# Patient Record
(demographics unavailable — no encounter records)

---

## 2024-10-21 NOTE — ASSESSMENT
[FreeTextEntry1] : Forma completed for Physical therapy and balance Labs check Valproic acid level and also Serum Sodium No change in meds for now; RTC 2 months

## 2024-10-21 NOTE — HISTORY OF PRESENT ILLNESS
[FreeTextEntry1] : Getting physical therapy for balance issues after  shunt  [de-identified] : Medication without change  Using CPAP  No Flu vaccine

## 2024-11-27 NOTE — HISTORY OF PRESENT ILLNESS
[FreeTextEntry8] : Patient with worsening urinary incontinence while sleeping However, does drink large amount of fluids even before he goes to bed  Encouraged him to stop drinking am hour before he goes to bed

## 2024-12-30 NOTE — HEALTH RISK ASSESSMENT
[No] : No [No falls in past year] : Patient reported no falls in the past year [0] : 2) Feeling down, depressed, or hopeless: Not at all (0) [[id number]] : 0

## 2024-12-30 NOTE — HISTORY OF PRESENT ILLNESS
[FreeTextEntry1] : Has some insurance issues regarding meds and also physical therapy  [de-identified] : Not working On disability

## 2025-01-30 NOTE — HEALTH RISK ASSESSMENT
[No] : No [No falls in past year] : Patient reported no falls in the past year [0] : 2) Feeling down, depressed, or hopeless: Not at all (0) [HSV1Oizgs] : 0

## 2025-03-31 NOTE — HEALTH RISK ASSESSMENT
[No] : No [No falls in past year] : Patient reported no falls in the past year [0] : 2) Feeling down, depressed, or hopeless: Not at all (0) [Never] : Never [[id number]] : 0

## 2025-03-31 NOTE — HISTORY OF PRESENT ILLNESS
[FreeTextEntry1] : Needs form completed for disability   [de-identified] : Incontinence  Gets upset constantly  Being followed by Dr Easton, also Dr. Damico No chang in curren tmedication

## 2025-05-07 NOTE — HISTORY OF PRESENT ILLNESS
[FreeTextEntry1] : 47M with hx of multiple brain surgery/ shunt,, hx of seizures,  presents as new visit for nocturnal enuresis  Referred by: Dr. Sullivan  Presents today with mother. Nocturnal enuresis almost every night. Soaks through his pampers.  Urinary nocturnal enuresis worsened over the last year.  Reports having more control during the day but still has urinary urgency, sometimes has urge incontinence, has to relieve himself wherever he can. Wears pampers during day as well. Incontinence started after his brain surgery.  Does not feel empty after urination.  No dysuria or hematuria.  has been on oxybutynin for the last 2-3 years. Started mirabegron over the last year. No improvement.  Has sleep apnea, uses CPAP Machine nightly.  Has girlfriend. No ED or EjD.    NeuroSx:  Initially was following with Dr. Peck in the past for routine neuro checks. Last seizure 1/2022. VPS last adjusted 8/2022. Currently following with Dr. Easton, has pending appt next week.  Also following with Dr. D'Amico for  shunt - CT head stable 1/2024.    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

## 2025-05-07 NOTE — ASSESSMENT
[FreeTextEntry1] : Hx of multiple brain surgeries/brain shunt/seizures neurogenic bladder  secondary nocturnal enuresis  sleep apnea, on CPAP  No further improvement on oxybutynin + mirabegron   UA/UCx PVR to r/o overflow incontinence/retention --> 95 cc pending appointment with neurosurgery next week  stop myrbetriq  start oxybutynin 15 mg ER nightly referral to Dr. De La Paz for urodynamics

## 2025-05-07 NOTE — PHYSICAL EXAM
[General Appearance - Well Developed] : well developed [General Appearance - Well Nourished] : well nourished [] : no respiratory distress [Abdomen Tenderness] : non-tender [Costovertebral Angle Tenderness] : no ~M costovertebral angle tenderness [Penis Abnormality] : normal uncircumcised penis [Scrotum] : the scrotum was normal [Testes Tenderness] : no tenderness of the testes [de-identified] : 14 cc testicles b/l, palpable vas b/l

## 2025-05-12 NOTE — ASSESSMENT
[FreeTextEntry1] : 47 year old presenting for follow up with known NAVARRO on PAP therapy.   Data reviewed: Compliance report 04/09/25-05/08/25- 18/30 days used; 5 hours and 7 minutes time used; AHI 3.8    NAVARRO Insomnia Snoring   Patient with previous sleep study reported at [facility] and compliance report showing low use. Advised that he does need to try and use every night with minimum of 70% and 4 hours. He is using enough hours just needs to increase days used. Seems to be having symptoms return because of minimal use.  - Advised need to increase days used - At goal hours - Previous clinical benefit

## 2025-05-12 NOTE — HISTORY OF PRESENT ILLNESS
[Never] : never [TextBox_4] : 47 year old with long standing hx of NAVARRO presenting for follow up on PAP therapy. States that he does use the machine most nights. Still feels and improvement from the PAP therapy. Able to get supplies without issues but does state that he is not using the machine every night.  [ESS] : 5

## 2025-06-02 NOTE — HEALTH RISK ASSESSMENT
[No] : No [No falls in past year] : Patient reported no falls in the past year [0] : 2) Feeling down, depressed, or hopeless: Not at all (0) [Never] : Never [[id number]] : 0

## 2025-06-02 NOTE — PLAN
[FreeTextEntry1] : Multiple issues discussed ; Valproic acid added once a day; Will discuss with Dr Wright  To repeat labs; Urology plans noted

## 2025-06-02 NOTE — HISTORY OF PRESENT ILLNESS
[FreeTextEntry1] : All notes reviewed; Dr Easton had stopped Valproic acid, but mom states he was worse and more incontinence  Mom had started once a night with improvement  [de-identified] : All other meds reviewed with patients' mother

## 2025-06-11 NOTE — ASSESSMENT
[FreeTextEntry1] :   Impression/plan: 47-year-old gentleman with multiple brain surgeries/ shunt with OAB-wet.  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  Failed 1st and 2nd line therapies. 1. Options for management of the patient's overactive bladder and incontinence discussed at length. These included medical therapy, behavioral modification, bladder retraining, and surgical options. Surgical options reviewed included injection of botulinum toxin versus sacral nerve stimulation therapy.  Literature provided for the patient to review.  He would like to discuss with his family, I offered discussed with them as well after the visit today.  He will think about the options and call back with a final decision.

## 2025-06-11 NOTE — HISTORY OF PRESENT ILLNESS
[FreeTextEntry1] : 47-year-old gentleman with multiple brain surgeries/ shunt with a history of seizures referred for daytime frequency and urge incontinence in addition to nocturnal enuresis.  He has tried oral med therapies which have been unsuccessful in the form of both anticholinergics and beta 3 agonist.  He goes through several Pampers in the evening hours which soaked through.  He is very unhappy with his voiding pattern.  He did have urodynamics performed, here today to review the study and discuss treatment options.  Chart reviewed extensively.  UDS -   Filling/Storage Phase: First sensation 57 mL, First desire 57 mL and Normal desire 142 mL. Involuntary contractions were present . Pt did not demonstrate stress urinary incontinence. Pt demomnstrated urge urinary incontinence. DLPP 98 cm/H20. Compliance: normal. EMG Activity: normal.    Voiding Phase: Qmax 9.1 mL/s , Pdet at Qmax 44 cm/H20, Qmax at Pdet 7.9 mL/s, Pavg 50.9 cm/H20 and Qavg 4.3 mL/s. EMG activity was synergic.   Additional Comments: Corrected BOOI - 26.   Urodynamic Interpretation :   Normal bladder sensation. Decreased bladder capacity. Patient experiencing detrusor instability. Pt has an appropriate EMG response to involuntary contractions:. The patient has severe  urge incontinence. The uroflow rate is decreased. The uroflow pattern is staccato. The detrusor contractility is normal. The intravesical voiding pressure is normal. The patient has complete bladder emptying. The spincter activity  is normal synergic.   Additional Procedure Related Findings/Comments: pt experienced leakage twice, there is no true capacity. Pt voided everything else in the restroom. PVR 0.

## 2025-06-11 NOTE — PHYSICAL EXAM
[General Appearance - Well Developed] : well developed [General Appearance - Well Nourished] : well nourished [Normal Appearance] : normal appearance [Well Groomed] : well groomed [General Appearance - In No Acute Distress] : no acute distress [Respiration, Rhythm And Depth] : normal respiratory rhythm and effort [] : no respiratory distress [Normal Station and Gait] : the gait and station were normal for the patient's age [Exaggerated Use Of Accessory Muscles For Inspiration] : no accessory muscle use [Oriented To Time, Place, And Person] : oriented to person, place, and time

## 2025-06-11 NOTE — LETTER BODY
[Dear  ___] : Dear  [unfilled], [Consult Letter:] : I had the pleasure of evaluating your patient, [unfilled]. [Please see my note below.] : Please see my note below. [Consult Closing:] : Thank you very much for allowing me to participate in the care of this patient.  If you have any questions, please do not hesitate to contact me. [Sincerely,] : Sincerely, [FreeTextEntry3] : Melecio De La Paz MD System Director Urogynecology/URPS Department of Urology [facility]   at [facility]  of Urology [facility] [employer] [facility] at [facility]

## 2025-07-02 NOTE — HISTORY OF PRESENT ILLNESS
Pls call pt and ask which pharmacy lorazepam should go to.   [FreeTextEntry1] : 47 year old male with PMHx significant for congenital aqueductal stenosis and hydrocephalus 2011 s/p third ventriculostomy 2011, fall 2013 c/p SDH s/p left-sided craniotomy for evac and VPS placement in October 2013 by Dr. Ramos ([facility]), seizures, etoh, who p/w worsening condition, gait instability, falls.  3/16/23 pt presented for evaluation of progressively worsening gait instability with multiple falls, worsening nighttime incontinence x 1 year. Continues with memory issues and cognitive changes. Gait and balance is worse when turning. Also with recent seizure, states compliant with Lamictal but that he has not recently seen neurologist. Endorses drinking when seizure last month. Xray shunt series shows no discontinuity. CTH with stable ventricular size from his most recent scan. Shunt adjusted from 1.0 to 0.5 and plan made to RTC 2 weeks with repeat CTH. Referral provided to Dr. Kelly neurologist to establish care with neurologist. Of note, left-sided Margarita's and left-sided hyperreflexia. MRI cervical spine to rule out cervical pathology ordered.  3/30/22 F/u: Denied any changes since shunt adjustment 2 weeks ago, continues to have gait instability, memory changes, nighttime incontinence. Denied any more falls. Shunt at 0.5. Plan to obtain MRI cervical and repeat CTH 4 weeks with follow- up after.  4/27/22 pt returned. CTH 4/19/22 stable. MRI cervical spine 4/19/22 without compressive pathology. Shunt maintained at 0.5. Recommended pt rtc 1 year with new CTH.  8/31/22 pt returned. He reported unwitnessed fall at home ~2 weeks ago while he was waking up and fell out of bed with unknown cause and possible head trauma. As per patient, he woke up with rib pain with headache. He was seen internal medicine Dr. Guy Seo who ordered CTH. He c/o worsening personality changes (more irritable), persistent urinary incontinence, worsening balance. His seizure medications (Lamictal) has been managed by Dr. Sullivan, has not following neurology since 2018. known h/o absent seizure. CTH reviewed, persistent ventriculomegaly compared to the previous CTH. Recommended he see neurologist Dr. Easton and Dr. Esparza for cognitive NP testing.  8/31/23 pt returned for f/u: He completed cognitive NP testing with Dr. Esparza 8/15/22. Endorsed continued balance issues, short term memory issues, urinary incontinence. Discussed benefits of lumbar puncture; however, pt wished to continue with conservative management. Medtronic shunt maintained @ 0.5. Plan made to notify if would like to pursue LP.  1/10/24 pt returned for f/u and review of recent CTH done 12/27/23 which was stable. Endorsed gait with slight improvement since last visit with physical therapy. Urinary incontinence unchanged. Denies worsening of symptoms. Recommended continued f/u with PCP for comprehensive care, RTC prn.   Returns TODAY for evaluation due to worsening walking and nighttime incontinence.  Repeated CTH 6/25/25.  Patient endorses walking recently worsening over the past few months. He feels his left foot drags when walking, but denies weakness of left foot. Denies neck pain/back pain.  He continues with physical therapy.  Also with nighttime urinary incontinence worsening over the past few months. He has continued follow- up with urology s/p urodynamic interpretation which showed  phasic detrusor overactivity with urge incontinence. Surgical options discussed as he failed first and second line therapies; however he wished to think about it.    PCP: Dr. Anamaria Sullivan  Neurology: Vicky Easton Urology: Melecio De La Paz

## 2025-07-02 NOTE — DATA REVIEWED
[de-identified] : ACC: [id number]     EXAM:  CT BRAIN   ORDERED BY: KATHERINESMITH FRAZIERJEREMYPING  PROCEDURE DATE:  06/25/2025    INTERPRETATION:  CT head without IV contrast  CLINICAL INFORMATION:     reassess ventricular size;  ACT  Ordering Dxs: G91.9 Hydrocephalus, unspecified  TECHNIQUE:  Contiguous axial 3 mm thick images were acquired.  This data set was reconstructed in the sagittal and coronal planes.  Contrast was not administered for this examination. CONTRAST:    None DOSE INFORMATION:   This scan was performed using automatic exposure control (radiation dose reduction software) to obtain a diagnostic image quality scan with patient dose as low as reasonably achievable.   Total DLP for this examination is estimated at 839 mGy*cm.  COMPARISON:   CT head 12/27/2023  FINDINGS:  BRAIN PARENCHYMA:    The brain  remains significant for focal encephalomalacia involving the left temporal tip. There is no overlying right-sided craniotomy. There is a lesser degree of involvement at the left frontal operculum and anterior insular cortex. These findings appear unchanged from 2023. The right cerebral hemisphere remains intact.  No acute cerebral cortical infarct is seen.  No intracranial hemorrhage is found.  No mass effect is found in the brain.  CSF SPACES: Right-sided ventricular catheter crosses the anterior parasagittal right frontal lobe frontal horn of the right lateral ventricle and septum pellucidum to terminate in the anterior body of the left lateral ventricle. The lateral ventricles remain markedly dilated. The ependymal margin approaches the gray-white matter boundary most prominently at the posterior frontal vertex. There is differential dilatation of temporal horn left lateral ventricle on an ex vacuo basis. The third ventricle is dilated. The cerebral aqueduct may be attenuated at its distal aspect. The fourth ventricle is not dilated. By the CT technique there is no evidence for transependymal resorption of CSF.  HEAD AND NECK STRUCTURES:  The orbits are unremarkable.  The included paranasal sinuses  are clear.  The nasal cavity appears intact.  The nasopharynx is symmetric.  The central skull base is intact.  The temporal bones demonstrate patent petrous air cells.  The calvarium demonstrates multiple craniotomies.   IMPRESSION:  1. Ventricular configuration is unchanged since December 2023. Right frontal ventricular catheter appears unchanged  2. Left temporal frontal and insular focal encephalomalacia with overlying craniotomy, also unchanged  --- End of Report ---

## 2025-07-02 NOTE — PHYSICAL EXAM
[General Appearance - Alert] : alert [General Appearance - In No Acute Distress] : in no acute distress [General Appearance - Well-Appearing] : healthy appearing [Person] : oriented to person [Place] : oriented to place [Time] : oriented to time [Motor Tone] : muscle tone was normal in all four extremities [Motor Strength] : muscle strength was normal in all four extremities [Sensation Tactile Decrease] : light touch was intact [Sclera] : the sclera and conjunctiva were normal [Neck Appearance] : the appearance of the neck was normal [] : no respiratory distress [Respiration, Rhythm And Depth] : normal respiratory rhythm and effort [Skin Color & Pigmentation] : normal skin color and pigmentation [FreeTextEntry5] : CN II-XII grossly intact

## 2025-07-02 NOTE — ASSESSMENT
[FreeTextEntry1] : This 47-year-old patient presented with worsening gait instability, multiple falls, and nighttime urinary incontinence. They have a history of congenital aqueductal stenosis, hydrocephalus, and a Medtronic Strata  shunt placed in 2013. A previous shunt adjustment (from 1 to 0.5) did not help.  They also have a history of seizures.  A recent CT scan showed no changes, and the shunt appears to be functioning properly. Shunt tap was performed. Opening pressure was <3mm H20. WIll evaluate for progress before discussing possible shunt revision.    Dr. D'Amico independently reviewed all available images with patient and his mother.  VPS pumps and refills. Setting confirmed at 0.5  S/P shunt tap in the office. Sterile precautions maintained.  Opening pressure: <2 30 cc CSF removed  PLAN: - Follow- up tomorrow via telephone for check in and re-eval if symptom improvement   Patient verbalizes understanding of today's discussion and next steps in treatment plan.    Today, my ACP, Lelo Martinez, was here to observe my evaluation and management services for this new problem/exacerbated condition to be followed going forward.        Patient appreciates and agrees with current plan. This note was generated using medical dictation software. A reasonable effort has been made for proofreading its contents, but typos may still remain. If there are any questions or points of clarification needed, please notify my office.   A total of 25 minutes was spent reviewing the labs, imaging and physical examination of the patient. We discussed the diagnosis, and the plan. The patient's questions were answered. The patient demonstrated an excellent understanding of the plan.

## 2025-07-02 NOTE — REASON FOR VISIT
[Follow-Up: _____] : a [unfilled] follow-up visit [Parent] : parent [FreeTextEntry1] : Hx of Medtronic Strata VPS. Follow- up and CTH review

## 2025-07-02 NOTE — HISTORY OF PRESENT ILLNESS
[FreeTextEntry1] : 47 year old male with PMHx significant for congenital aqueductal stenosis and hydrocephalus 2011 s/p third ventriculostomy 2011, fall 2013 c/p SDH s/p left-sided craniotomy for evac and VPS placement in October 2013 by Dr. Ramos ([facility]), seizures, etoh, who p/w worsening condition, gait instability, falls.  3/16/23 pt presented for evaluation of progressively worsening gait instability with multiple falls, worsening nighttime incontinence x 1 year. Continues with memory issues and cognitive changes. Gait and balance is worse when turning. Also with recent seizure, states compliant with Lamictal but that he has not recently seen neurologist. Endorses drinking when seizure last month. Xray shunt series shows no discontinuity. CTH with stable ventricular size from his most recent scan. Shunt adjusted from 1.0 to 0.5 and plan made to RTC 2 weeks with repeat CTH. Referral provided to Dr. Kelly neurologist to establish care with neurologist. Of note, left-sided Margarita's and left-sided hyperreflexia. MRI cervical spine to rule out cervical pathology ordered.  3/30/22 F/u: Denied any changes since shunt adjustment 2 weeks ago, continues to have gait instability, memory changes, nighttime incontinence. Denied any more falls. Shunt at 0.5. Plan to obtain MRI cervical and repeat CTH 4 weeks with follow- up after.  4/27/22 pt returned. CTH 4/19/22 stable. MRI cervical spine 4/19/22 without compressive pathology. Shunt maintained at 0.5. Recommended pt rtc 1 year with new CTH.  8/31/22 pt returned. He reported unwitnessed fall at home ~2 weeks ago while he was waking up and fell out of bed with unknown cause and possible head trauma. As per patient, he woke up with rib pain with headache. He was seen internal medicine Dr. Guy Seo who ordered CTH. He c/o worsening personality changes (more irritable), persistent urinary incontinence, worsening balance. His seizure medications (Lamictal) has been managed by Dr. Sullivan, has not following neurology since 2018. known h/o absent seizure. CTH reviewed, persistent ventriculomegaly compared to the previous CTH. Recommended he see neurologist Dr. Easton and Dr. Esparza for cognitive NP testing.  8/31/23 pt returned for f/u: He completed cognitive NP testing with Dr. Esparza 8/15/22. Endorsed continued balance issues, short term memory issues, urinary incontinence. Discussed benefits of lumbar puncture; however, pt wished to continue with conservative management. Medtronic shunt maintained @ 0.5. Plan made to notify if would like to pursue LP.  1/10/24 pt returned for f/u and review of recent CTH done 12/27/23 which was stable. Endorsed gait with slight improvement since last visit with physical therapy. Urinary incontinence unchanged. Denies worsening of symptoms. Recommended continued f/u with PCP for comprehensive care, RTC prn.   Returns TODAY for evaluation due to worsening walking and nighttime incontinence.  Repeated CTH 6/25/25.  Patient endorses walking recently worsening over the past few months. He feels his left foot drags when walking, but denies weakness of left foot. Denies neck pain/back pain.  He continues with physical therapy.  Also with nighttime urinary incontinence worsening over the past few months. He has continued follow- up with urology s/p urodynamic interpretation which showed  phasic detrusor overactivity with urge incontinence. Surgical options discussed as he failed first and second line therapies; however he wished to think about it.    PCP: Dr. Anamaria Sullivan  Neurology: Vicky Easton Urology: Melecio De La Paz

## 2025-07-02 NOTE — DATA REVIEWED
[de-identified] : ACC: [id number]     EXAM:  CT BRAIN   ORDERED BY: KATHERINESMITH FRAZIERJEREMYPING  PROCEDURE DATE:  06/25/2025    INTERPRETATION:  CT head without IV contrast  CLINICAL INFORMATION:     reassess ventricular size;  ACT  Ordering Dxs: G91.9 Hydrocephalus, unspecified  TECHNIQUE:  Contiguous axial 3 mm thick images were acquired.  This data set was reconstructed in the sagittal and coronal planes.  Contrast was not administered for this examination. CONTRAST:    None DOSE INFORMATION:   This scan was performed using automatic exposure control (radiation dose reduction software) to obtain a diagnostic image quality scan with patient dose as low as reasonably achievable.   Total DLP for this examination is estimated at 839 mGy*cm.  COMPARISON:   CT head 12/27/2023  FINDINGS:  BRAIN PARENCHYMA:    The brain  remains significant for focal encephalomalacia involving the left temporal tip. There is no overlying right-sided craniotomy. There is a lesser degree of involvement at the left frontal operculum and anterior insular cortex. These findings appear unchanged from 2023. The right cerebral hemisphere remains intact.  No acute cerebral cortical infarct is seen.  No intracranial hemorrhage is found.  No mass effect is found in the brain.  CSF SPACES: Right-sided ventricular catheter crosses the anterior parasagittal right frontal lobe frontal horn of the right lateral ventricle and septum pellucidum to terminate in the anterior body of the left lateral ventricle. The lateral ventricles remain markedly dilated. The ependymal margin approaches the gray-white matter boundary most prominently at the posterior frontal vertex. There is differential dilatation of temporal horn left lateral ventricle on an ex vacuo basis. The third ventricle is dilated. The cerebral aqueduct may be attenuated at its distal aspect. The fourth ventricle is not dilated. By the CT technique there is no evidence for transependymal resorption of CSF.  HEAD AND NECK STRUCTURES:  The orbits are unremarkable.  The included paranasal sinuses  are clear.  The nasal cavity appears intact.  The nasopharynx is symmetric.  The central skull base is intact.  The temporal bones demonstrate patent petrous air cells.  The calvarium demonstrates multiple craniotomies.   IMPRESSION:  1. Ventricular configuration is unchanged since December 2023. Right frontal ventricular catheter appears unchanged  2. Left temporal frontal and insular focal encephalomalacia with overlying craniotomy, also unchanged  --- End of Report ---

## 2025-07-03 NOTE — ASSESSMENT
[FreeTextEntry1] : This patient, Kenneth Munroe, presented for follow-up of a traumatic brain injury (TBI) and possible shunt complications. He has a history of TBI with prior surgery and a Mitronic Strata shunt placed in October 2013 by Dr. Corrales.  He now reports worsening gait and nighttime urinary incontinence, possibly related to shunt malfunction or TBI progression. Shunt tap yesterday with 30cc of CSF removed. Patient reports some improvement in balance, but not enough to warrant surgical intervention. Will follow.    Dr. D'Amico independently reviewed all available images with patient and after his sister Karolina.    PLAN: - Continue physical therapy - Continue f/u with neurology - Follow- up prn   Patient verbalizes understanding of today's discussion and next steps in treatment plan.   Today, my ACP, Lelo Martinez, was here to observe my evaluation and management services for this new problem/exacerbated condition to be followed going forward.        Patient appreciates and agrees with current plan. This note was generated using medical dictation software. A reasonable effort has been made for proofreading its contents, but typos may still remain. If there are any questions or points of clarification needed, please notify my office.   A total of 25 minutes was spent reviewing the labs, imaging and physical examination of the patient. We discussed the diagnosis, and the plan. The patient's questions were answered. The patient demonstrated an excellent understanding of the plan.

## 2025-07-03 NOTE — HISTORY OF PRESENT ILLNESS
[FreeTextEntry1] : 47 year old male with PMHx significant for congenital aqueductal stenosis and hydrocephalus 2011 s/p third ventriculostomy 2011, fall 2013 c/p SDH s/p left-sided craniotomy for evac and VPS placement in October 2013 by Dr. Ramos ([email or web address]), seizures, etoh, who p/w worsening condition, gait instability, falls.  3/16/23 pt presented for evaluation of progressively worsening gait instability with multiple falls, worsening nighttime incontinence x 1 year. Continues with memory issues and cognitive changes. Gait and balance is worse when turning. Also with recent seizure, states compliant with Lamictal but that he has not recently seen neurologist. Endorses drinking when seizure last month. Xray shunt series shows no discontinuity. CTH with stable ventricular size from his most recent scan. Shunt adjusted from 1.0 to 0.5 and plan made to RTC 2 weeks with repeat CTH. Referral provided to Dr. Kelly neurologist to establish care with neurologist. Of note, left-sided Margarita's and left-sided hyperreflexia. MRI cervical spine to rule out cervical pathology ordered.  3/30/22 F/u: Denied any changes since shunt adjustment 2 weeks ago, continues to have gait instability, memory changes, nighttime incontinence. Denied any more falls. Shunt at 0.5. Plan to obtain MRI cervical and repeat CTH 4 weeks with follow- up after.  4/27/22 pt returned. CTH 4/19/22 stable. MRI cervical spine 4/19/22 without compressive pathology. Shunt maintained at 0.5. Recommended pt rtc 1 year with new CTH.  8/31/22 pt returned. He reported unwitnessed fall at home ~2 weeks ago while he was waking up and fell out of bed with unknown cause and possible head trauma. As per patient, he woke up with rib pain with headache. He was seen internal medicine Dr. Guy Seo who ordered CTH. He c/o worsening personality changes (more irritable), persistent urinary incontinence, worsening balance. His seizure medications (Lamictal) has been managed by Dr. Sullivan, has not following neurology since 2018. known h/o absent seizure. CTH reviewed, persistent ventriculomegaly compared to the previous CTH. Recommended he see neurologist Dr. Easton and Dr. Esparza for cognitive NP testing.  8/31/23 pt returned for f/u: He completed cognitive NP testing with Dr. Esparza 8/15/22. Endorsed continued balance issues, short term memory issues, urinary incontinence. Discussed benefits of lumbar puncture; however, pt wished to continue with conservative management. Medtronic shunt maintained @ 0.5. Plan made to notify if would like to pursue LP.  1/10/24 pt returned for f/u and review of recent CTH done 12/27/23 which was stable. Endorsed gait with slight improvement since last visit with physical therapy. Urinary incontinence unchanged. Denies worsening of symptoms. Recommended continued f/u with PCP for comprehensive care, RTC prn.  7/2/25 pt returned for evaluation due to worsening walking and nighttime incontinence x few months. Also with symptoms of left foot dragging when walking, but denies neck pain/back pain/ weakness of foot.  Repeated CTH 6/25/25 which was stable.  S/P shunt tap in the office:  Opening pressure: <2 30 cc CSF removed  Returns TODAY for one day follow- up and check in of symptoms after shunt tap yesterday.  He notes balance maybe slightly better post tap yesterday, but no significant improvement.   PCP: Dr. Anamaria Sullivan Neurology: Vicky Easton Urology: Melecio De La Paz

## 2025-07-03 NOTE — REASON FOR VISIT
[Follow-Up: _____] : a [unfilled] follow-up visit [Other:____] : [unfilled] [Home] : at home, [unfilled] , at the time of the visit. [Medical Office: ([facility])___] : at the medical office located in  [Telephone (audio)] : This telephonic visit was provided via audio only technology. [Patient preference] : patient preference. [Verbal consent obtained from patient] : the patient, [unfilled] [FreeTextEntry1] :  Hx of Medtronic Strata VPS placed 2013 at [facility]. S/P shunt tap yesterday in clinic. Follow- up for re-eval of symptoms

## 2025-07-03 NOTE — DATA REVIEWED
[de-identified] : ACC: [id number]     EXAM:  CT BRAIN   ORDERED BY: KATHERINESMITH FRAZIERJEREMYPING  PROCEDURE DATE:  06/25/2025    INTERPRETATION:  CT head without IV contrast  CLINICAL INFORMATION:     reassess ventricular size;  ACT  Ordering Dxs: G91.9 Hydrocephalus, unspecified  TECHNIQUE:  Contiguous axial 3 mm thick images were acquired.  This data set was reconstructed in the sagittal and coronal planes.  Contrast was not administered for this examination. CONTRAST:    None DOSE INFORMATION:   This scan was performed using automatic exposure control (radiation dose reduction software) to obtain a diagnostic image quality scan with patient dose as low as reasonably achievable.   Total DLP for this examination is estimated at 839 mGy*cm.  COMPARISON:   CT head 12/27/2023  FINDINGS:  BRAIN PARENCHYMA:    The brain  remains significant for focal encephalomalacia involving the left temporal tip. There is no overlying right-sided craniotomy. There is a lesser degree of involvement at the left frontal operculum and anterior insular cortex. These findings appear unchanged from 2023. The right cerebral hemisphere remains intact.  No acute cerebral cortical infarct is seen.  No intracranial hemorrhage is found.  No mass effect is found in the brain.  CSF SPACES: Right-sided ventricular catheter crosses the anterior parasagittal right frontal lobe frontal horn of the right lateral ventricle and septum pellucidum to terminate in the anterior body of the left lateral ventricle. The lateral ventricles remain markedly dilated. The ependymal margin approaches the gray-white matter boundary most prominently at the posterior frontal vertex. There is differential dilatation of temporal horn left lateral ventricle on an ex vacuo basis. The third ventricle is dilated. The cerebral aqueduct may be attenuated at its distal aspect. The fourth ventricle is not dilated. By the CT technique there is no evidence for transependymal resorption of CSF.  HEAD AND NECK STRUCTURES:  The orbits are unremarkable.  The included paranasal sinuses  are clear.  The nasal cavity appears intact.  The nasopharynx is symmetric.  The central skull base is intact.  The temporal bones demonstrate patent petrous air cells.  The calvarium demonstrates multiple craniotomies.   IMPRESSION:  1. Ventricular configuration is unchanged since December 2023. Right frontal ventricular catheter appears unchanged  2. Left temporal frontal and insular focal encephalomalacia with overlying craniotomy, also unchanged  --- End of Report ---